# Patient Record
Sex: FEMALE | Race: WHITE | URBAN - METROPOLITAN AREA
[De-identification: names, ages, dates, MRNs, and addresses within clinical notes are randomized per-mention and may not be internally consistent; named-entity substitution may affect disease eponyms.]

---

## 2017-11-13 PROBLEM — M54.2 NECK PAIN: Status: ACTIVE | Noted: 2017-02-23

## 2017-11-13 PROBLEM — G47.33 OSA (OBSTRUCTIVE SLEEP APNEA): Status: ACTIVE | Noted: 2017-04-26

## 2017-11-13 PROBLEM — H90.3 SENSORINEURAL HEARING LOSS (SNHL) OF BOTH EARS: Status: ACTIVE | Noted: 2017-07-06

## 2017-11-13 PROBLEM — N39.0 RECURRENT UTI: Status: ACTIVE | Noted: 2017-11-13

## 2017-11-13 PROBLEM — R10.9 FLANK PAIN: Status: ACTIVE | Noted: 2017-11-13

## 2017-11-13 PROBLEM — R39.15 URINARY URGENCY: Status: ACTIVE | Noted: 2017-03-14

## 2017-11-13 PROBLEM — L98.8 SKIN LESION OF BREAST: Status: ACTIVE | Noted: 2017-03-23

## 2017-11-13 PROBLEM — R03.0 ELEVATED BLOOD PRESSURE READING WITHOUT DIAGNOSIS OF HYPERTENSION: Status: ACTIVE | Noted: 2017-10-23

## 2017-11-13 PROBLEM — I48.91 ATRIAL FIBRILLATION (HCC): Status: ACTIVE | Noted: 2017-11-13

## 2017-11-13 PROBLEM — R31.0 HEMATURIA, GROSS: Status: ACTIVE | Noted: 2017-08-14

## 2017-11-13 PROBLEM — R26.2 DIFFICULTY IN WALKING: Status: ACTIVE | Noted: 2017-10-13

## 2017-11-13 PROBLEM — N39.46 MIXED INCONTINENCE: Status: ACTIVE | Noted: 2017-03-23

## 2017-11-13 PROBLEM — N64.59 OTHER SIGNS AND SYMPTOMS IN BREAST: Status: ACTIVE | Noted: 2017-02-22

## 2017-11-13 PROBLEM — R31.29 MICROHEMATURIA: Status: ACTIVE | Noted: 2017-11-13

## 2017-11-13 PROBLEM — E78.5 DYSLIPIDEMIA: Status: ACTIVE | Noted: 2017-11-13

## 2017-11-17 PROBLEM — R03.0 ELEVATED BLOOD PRESSURE READING: Status: ACTIVE | Noted: 2017-11-07

## 2021-09-02 ENCOUNTER — OFFICE VISIT (OUTPATIENT)
Dept: ORTHOPEDIC SURGERY | Age: 81
End: 2021-09-02
Payer: MEDICARE

## 2021-09-02 VITALS
TEMPERATURE: 97.2 F | OXYGEN SATURATION: 97 % | HEART RATE: 69 BPM | BODY MASS INDEX: 19.37 KG/M2 | HEIGHT: 61 IN | WEIGHT: 102.6 LBS

## 2021-09-02 DIAGNOSIS — M65.342 TRIGGER RING FINGER OF LEFT HAND: Primary | ICD-10-CM

## 2021-09-02 PROCEDURE — 1090F PRES/ABSN URINE INCON ASSESS: CPT | Performed by: ORTHOPAEDIC SURGERY

## 2021-09-02 PROCEDURE — 20550 NJX 1 TENDON SHEATH/LIGAMENT: CPT | Performed by: ORTHOPAEDIC SURGERY

## 2021-09-02 PROCEDURE — G8536 NO DOC ELDER MAL SCRN: HCPCS | Performed by: ORTHOPAEDIC SURGERY

## 2021-09-02 PROCEDURE — G8400 PT W/DXA NO RESULTS DOC: HCPCS | Performed by: ORTHOPAEDIC SURGERY

## 2021-09-02 PROCEDURE — G9717 DOC PT DX DEP/BP F/U NT REQ: HCPCS | Performed by: ORTHOPAEDIC SURGERY

## 2021-09-02 PROCEDURE — 99203 OFFICE O/P NEW LOW 30 MIN: CPT | Performed by: ORTHOPAEDIC SURGERY

## 2021-09-02 PROCEDURE — 1101F PT FALLS ASSESS-DOCD LE1/YR: CPT | Performed by: ORTHOPAEDIC SURGERY

## 2021-09-02 PROCEDURE — G8427 DOCREV CUR MEDS BY ELIG CLIN: HCPCS | Performed by: ORTHOPAEDIC SURGERY

## 2021-09-02 PROCEDURE — G8420 CALC BMI NORM PARAMETERS: HCPCS | Performed by: ORTHOPAEDIC SURGERY

## 2021-09-02 NOTE — PROGRESS NOTES
Nelly Rey is a 79 y.o. female right handed retiree. Worker's Compensation and legal considerations: none filed. Vitals:    09/02/21 1429   Pulse: 69   Temp: 97.2 °F (36.2 °C)   TempSrc: Skin   SpO2: 97%   Weight: 102 lb 9.6 oz (46.5 kg)   Height: 5' 1\" (1.549 m)   PainSc:  10 - Worst pain ever   PainLoc: Finger           Chief Complaint   Patient presents with    Finger Pain     left ring         HPI: Patient presents today with complaints of left ring finger pain and locking as well as decreased  strength. Date of onset: Approximately July 2021    Injury: No    Prior Treatment:  No    Numbness/ Tingling: No      ROS: Review of Systems - General ROS: negative  Psychological ROS: negative  ENT ROS: negative  Allergy and Immunology ROS: negative  Hematological and Lymphatic ROS: negative  Respiratory ROS: no cough, shortness of breath, or wheezing  Cardiovascular ROS: no chest pain or dyspnea on exertion  Gastrointestinal ROS: no abdominal pain, change in bowel habits, or black or bloody stools  Musculoskeletal ROS: negative  Neurological ROS: negative  Dermatological ROS: negative    Past Medical History:   Diagnosis Date    Abnormal involuntary movement 1/7/2010    Atrial fibrillation ([facility] [address] 11/13/2017    Overview:  CHADS-Vasc score 3 (AGE, Gender) 3.2 % annual embolic risk     Bleeding internal hemorrhoids 9/28/2016    Brachial neuritis or radiculitis 1/7/2010    Chronic interstitial cystitis     COPD (chronic obstructive pulmonary disease) (HCC)     Finger pain, left     Gastro-esophageal reflux disease with esophagitis 1/16/2007    Last Assessment & Plan:  Continue medications and follow up per GI. Would avoid NSAIDs due to gastritis.  Hematuria, gross 8/14/2017    Irritable bowel syndrome 3/23/2011    Last Assessment & Plan:  Has started using maalox prn with decent results. Interestingly this has helped with constipation. Can continue prn use.     Microhematuria 11/13/2017    Migraine headache 1/7/2010    Mixed incontinence     Nephrolithiasis     Recurrent UTI     Reflux esophagitis 1/7/2010    Temporary cerebral vascular dysfunction 9/28/2016    Urinary urgency        Past Surgical History:   Procedure Laterality Date    HX BACK SURGERY         Current Outpatient Medications   Medication Sig Dispense Refill    CHOLECALCIFEROL, VITAMIN D3, PO 5,000 mg.  cyanocobalamin, vitamin B-12, 1,000 mcg/mL kit by Injection route Every 14 Days.  apixaban (ELIQUIS) 2.5 mg tablet Take 2.5 mg by mouth.  alum-mag hydroxide-simeth (MAALOX ADVANCED) 200-200-20 mg/5 mL susp Take 30 mL by mouth every four (4) hours as needed.  apixaban (ELIQUIS) 2.5 mg tablet Take 2.5 mg by mouth two (2) times a day. (Patient not taking: Reported on 9/2/2021)      cephALEXin (KEFLEX) 500 mg capsule Take 1 Cap by mouth two (2) times a day.  (Patient not taking: Reported on 9/2/2021) 10 Cap 0     Current Facility-Administered Medications   Medication Dose Route Frequency Provider Last Rate Last Admin    triamcinolone acetonide (KENALOG) 10 mg/mL injection 5 mg  5 mg Other ONCE Rowdy Ibrahim, DO           Allergies   Allergen Reactions    Diclofenac Hives and Rash     Other reaction(s): rash/blisters/redness  Other reaction(s): rash/blisters/redness    Pregabalin Vertigo    Aspirin Other (comments) and Unknown (comments)    Codeine Nausea and Vomiting     Any derivatives    Escitalopram Hives    Escitalopram Oxalate Hives    Gabapentin Other (comments)     Irritable and anxious    Macrobid [Nitrofurantoin Monohyd/M-Cryst] Hives    Nefazodone Nausea and Vomiting    Oxycodone-Acetaminophen Nausea and Vomiting     Nausea/all codeine  derivatives    Prednisone Nausea and Vomiting    Propoxyphene N-Acetaminophen Nausea and Vomiting    Sulfa (Sulfonamide Antibiotics) Swelling    Sulfasalazine Unknown (comments)     Other reaction(s): Respiratory problems, e.g., wheezingDermatological problems, e.g., rash, hivesangioedema           PE:     Physical Exam  Vitals and nursing note reviewed. Constitutional:       General: She is not in acute distress. Appearance: Normal appearance. She is not ill-appearing. Cardiovascular:      Pulses: Normal pulses. Pulmonary:      Effort: Pulmonary effort is normal. No respiratory distress. Musculoskeletal:         General: Swelling and tenderness present. No deformity or signs of injury. Cervical back: Normal range of motion and neck supple. Right lower leg: No edema. Left lower leg: No edema. Skin:     General: Skin is warm and dry. Capillary Refill: Capillary refill takes less than 2 seconds. Findings: No bruising or erythema. Neurological:      General: No focal deficit present. Mental Status: She is alert and oriented to person, place, and time. Psychiatric:         Mood and Affect: Mood normal.         Behavior: Behavior normal.            Hand:    Examination L Digit(s) R Digit(s)   1st [facility] Tenderness -  -    1st [facility] Grind -  -    Kita Nodes -  -    [place] Nodes -  -    A1 Pulley Tenderness + RF -    Triggering + RF -    UCL Instability -  -    RCL Instability -  -    Lateral Stress Pain -  -    Palmar Cords -  -    Tabletop test -  -    Garrod's Pads -  -     Strength       Pinch Strength         ROM: Full        Imaging:     None indicated        ICD-10-CM ICD-9-CM    1. Trigger ring finger of left hand  M65.342 727.03 triamcinolone acetonide (KENALOG) 10 mg/mL injection 5 mg      INJECT TENDON SHEATH/LIGAMENT         Plan:     Left ring finger A1 pulley injection    Follow-up and Dispositions    · Return if symptoms worsen or fail to improve.           Plan was reviewed with patient, who verbalized agreement and understanding of the plan    [address] [facility] NOTE        Chart reviewed for the following:   IRowdy, DO, have reviewed the History, Physical and updated the Allergic reactions for Doris Castleman     TIME OUT performed immediately prior to start of procedure:   Rowdy DAHL DO, have performed the following reviews on Doris Castleman prior to the start of the procedure:            * Patient was identified by name and date of birth   * Agreement on procedure being performed was verified  * Risks and Benefits explained to the patient  * Procedure site verified and marked as necessary  * Patient was positioned for comfort  * Consent was signed and verified     Time: 14:55      Date of procedure: 9/2/2021    Procedure performed by: Jonnie Ceron DO    Provider assisted by: Edwina Whitten LPN    Patient assisted by: self    How tolerated by patient: tolerated the procedure well with no complications    Post Procedural Pain Scale: 0 - No Hurt    Comments: none    Procedure:  After consent was obtained, using sterile technique the left ring finger was prepped. Local anesthetic used: 1% lidocaine. Kenalog 5 mg and was then injected and the needle withdrawn. The procedure was well tolerated. The patient is asked to continue to rest the area for a few more days before resuming regular activities. It may be more painful for the first 1-2 days. Watch for fever, or increased swelling or persistent pain in the joint. Call or return to clinic prn if such symptoms occur or there is failure to improve as anticipated.